# Patient Record
Sex: FEMALE | Race: WHITE | NOT HISPANIC OR LATINO | Employment: OTHER | URBAN - METROPOLITAN AREA
[De-identification: names, ages, dates, MRNs, and addresses within clinical notes are randomized per-mention and may not be internally consistent; named-entity substitution may affect disease eponyms.]

---

## 2017-03-08 ENCOUNTER — PREPPED CHART (OUTPATIENT)
Dept: URBAN - METROPOLITAN AREA CLINIC 43 | Facility: CLINIC | Age: 57
End: 2017-03-08

## 2018-04-30 ENCOUNTER — ESTABLISHED COMPREHENSIVE EXAM (OUTPATIENT)
Dept: URBAN - METROPOLITAN AREA CLINIC 43 | Facility: CLINIC | Age: 58
End: 2018-04-30

## 2018-04-30 DIAGNOSIS — H52.4: ICD-10-CM

## 2018-04-30 DIAGNOSIS — H52.13: ICD-10-CM

## 2018-04-30 PROCEDURE — 92014 COMPRE OPH EXAM EST PT 1/>: CPT

## 2018-04-30 PROCEDURE — 92015 DETERMINE REFRACTIVE STATE: CPT

## 2018-04-30 ASSESSMENT — VISUAL ACUITY
OD_SC: 20/400
OD_BAT: 20/400
OD_CC: 20/25
OS_SC: J4
OD_SC: J2
OS_BAT: 20/400
OS_SC: 20/400
OS_CC: 20/30
OS_CC: J2
OD_CC: J2

## 2018-04-30 ASSESSMENT — TONOMETRY
OD_IOP_MMHG: 12
OS_IOP_MMHG: 13

## 2019-04-19 ENCOUNTER — ESTABLISHED COMPREHENSIVE EXAM (OUTPATIENT)
Dept: URBAN - METROPOLITAN AREA CLINIC 43 | Facility: CLINIC | Age: 59
End: 2019-04-19

## 2019-04-19 DIAGNOSIS — Z01.00: ICD-10-CM

## 2019-04-19 DIAGNOSIS — H52.13: ICD-10-CM

## 2019-04-19 DIAGNOSIS — H52.4: ICD-10-CM

## 2019-04-19 PROCEDURE — 92014 COMPRE OPH EXAM EST PT 1/>: CPT

## 2019-04-19 PROCEDURE — 92015 DETERMINE REFRACTIVE STATE: CPT

## 2019-04-19 ASSESSMENT — VISUAL ACUITY
OS_CC: 20/25-1
OD_SC: J1
OS_CC: J4
OS_SC: J2
OD_CC: J3
OD_CC: 20/25+1
OD_SC: 20/400
OS_SC: 20/400

## 2019-04-19 ASSESSMENT — TONOMETRY
OS_IOP_MMHG: 15
OD_IOP_MMHG: 15

## 2021-05-12 ENCOUNTER — ESTABLISHED COMPREHENSIVE EXAM (OUTPATIENT)
Dept: URBAN - METROPOLITAN AREA CLINIC 43 | Facility: CLINIC | Age: 61
End: 2021-05-12

## 2021-05-12 DIAGNOSIS — H52.13: ICD-10-CM

## 2021-05-12 DIAGNOSIS — H52.4: ICD-10-CM

## 2021-05-12 DIAGNOSIS — Z01.00: ICD-10-CM

## 2021-05-12 PROCEDURE — 92014 COMPRE OPH EXAM EST PT 1/>: CPT

## 2021-05-12 PROCEDURE — 92015 DETERMINE REFRACTIVE STATE: CPT

## 2021-05-12 ASSESSMENT — VISUAL ACUITY
OD_SC: J1
OD_CC: J1
OS_SC: 20/200
OD_SC: 20/400
OD_CC: 20/40+2
OS_CC: J2
OS_CC: 20/40+2
OS_SC: J3

## 2021-05-12 ASSESSMENT — TONOMETRY
OS_IOP_MMHG: 14
OD_IOP_MMHG: 14

## 2022-05-18 ENCOUNTER — COMPREHENSIVE EXAM (OUTPATIENT)
Dept: URBAN - METROPOLITAN AREA CLINIC 43 | Facility: CLINIC | Age: 62
End: 2022-05-18

## 2022-05-18 DIAGNOSIS — H52.13: ICD-10-CM

## 2022-05-18 DIAGNOSIS — H52.4: ICD-10-CM

## 2022-05-18 DIAGNOSIS — Z01.00: ICD-10-CM

## 2022-05-18 PROCEDURE — 92015 DETERMINE REFRACTIVE STATE: CPT

## 2022-05-18 PROCEDURE — 92014 COMPRE OPH EXAM EST PT 1/>: CPT

## 2022-05-18 ASSESSMENT — VISUAL ACUITY
OD_SC: J1
OS_SC: 20/400
OS_SC: J2
OD_SC: 20/400
OD_CC: 20/20-2
OD_CC: J2
OS_CC: J4
OS_CC: 20/25-1

## 2022-05-18 ASSESSMENT — TONOMETRY
OD_IOP_MMHG: 14
OS_IOP_MMHG: 15

## 2023-05-19 ENCOUNTER — COMPREHENSIVE EXAM (OUTPATIENT)
Dept: URBAN - METROPOLITAN AREA CLINIC 43 | Facility: CLINIC | Age: 63
End: 2023-05-19

## 2023-05-19 DIAGNOSIS — Z01.00: ICD-10-CM

## 2023-05-19 DIAGNOSIS — H52.13: ICD-10-CM

## 2023-05-19 DIAGNOSIS — H52.4: ICD-10-CM

## 2023-05-19 PROCEDURE — 92014 COMPRE OPH EXAM EST PT 1/>: CPT

## 2023-05-19 PROCEDURE — 92015 DETERMINE REFRACTIVE STATE: CPT

## 2023-05-19 ASSESSMENT — TONOMETRY
OD_IOP_MMHG: 14
OS_IOP_MMHG: 12

## 2023-05-19 ASSESSMENT — VISUAL ACUITY
OD_CC: 20/20-1
OS_CC: 20/25
OU_SC: J1
OU_CC: 20/20
OU_SC: 20/200+1
OS_CC: J6
OD_CC: J4
OS_SC: 20/200
OD_SC: J1
OD_SC: 20/200
OS_SC: J8
OU_CC: J1

## 2023-06-16 ENCOUNTER — CONTACT LENSES/GLASSES VISIT (OUTPATIENT)
Dept: URBAN - METROPOLITAN AREA CLINIC 43 | Facility: CLINIC | Age: 63
End: 2023-06-16

## 2023-06-16 DIAGNOSIS — H52.4: ICD-10-CM

## 2023-06-16 DIAGNOSIS — H52.13: ICD-10-CM

## 2023-06-16 PROCEDURE — 92015GRNC REFRACTION GLASSES RECHECK - NO CHARGE

## 2023-06-16 ASSESSMENT — VISUAL ACUITY
OD_PH: 20/30-2
OS_CC: 20/50-2
OD_CC: J4
OS_CC: J3
OU_CC: J2
OU_CC: 20/40
OD_CC: 20/50-2

## 2024-05-21 ENCOUNTER — COMPREHENSIVE EXAM (OUTPATIENT)
Dept: URBAN - METROPOLITAN AREA CLINIC 43 | Facility: CLINIC | Age: 64
End: 2024-05-21

## 2024-05-21 DIAGNOSIS — H52.4: ICD-10-CM

## 2024-05-21 DIAGNOSIS — Z01.00: ICD-10-CM

## 2024-05-21 DIAGNOSIS — H52.13: ICD-10-CM

## 2024-05-21 PROCEDURE — 92015 DETERMINE REFRACTIVE STATE: CPT

## 2024-05-21 PROCEDURE — 92014 COMPRE OPH EXAM EST PT 1/>: CPT

## 2024-05-21 ASSESSMENT — VISUAL ACUITY
OD_SC: J2
OD_CC: J4
OS_CC: 20/40-1
OD_CC: 20/40
OS_SC: 20/100
OS_CC: J6
OS_SC: J10
OD_SC: 20/400

## 2024-05-21 ASSESSMENT — TONOMETRY
OD_IOP_MMHG: 12
OS_IOP_MMHG: 13

## 2025-06-10 ENCOUNTER — COMPREHENSIVE EXAM (OUTPATIENT)
Age: 65
End: 2025-06-10

## 2025-06-10 DIAGNOSIS — Z01.00: ICD-10-CM

## 2025-06-10 DIAGNOSIS — H52.13: ICD-10-CM

## 2025-06-10 PROCEDURE — 92015 DETERMINE REFRACTIVE STATE: CPT

## 2025-06-10 PROCEDURE — 92014 COMPRE OPH EXAM EST PT 1/>: CPT
